# Patient Record
Sex: FEMALE | Race: WHITE | Employment: OTHER | URBAN - METROPOLITAN AREA
[De-identification: names, ages, dates, MRNs, and addresses within clinical notes are randomized per-mention and may not be internally consistent; named-entity substitution may affect disease eponyms.]

---

## 2020-08-21 ENCOUNTER — HOSPITAL ENCOUNTER (OUTPATIENT)
Dept: NON INVASIVE DIAGNOSTICS | Age: 70
Discharge: HOME | End: 2020-08-21
Payer: MEDICARE

## 2020-08-21 LAB
LV EF: 76 %
LVEF MODALITY: NORMAL

## 2020-08-21 PROCEDURE — 93306 TTE W/DOPPLER COMPLETE: CPT

## 2021-01-01 ENCOUNTER — HOSPITAL ENCOUNTER (EMERGENCY)
Age: 71
End: 2021-10-15
Attending: STUDENT IN AN ORGANIZED HEALTH CARE EDUCATION/TRAINING PROGRAM
Payer: MEDICARE

## 2021-01-01 VITALS — WEIGHT: 160 LBS

## 2021-01-01 DIAGNOSIS — I46.9 CARDIOPULMONARY ARREST (HCC): Primary | ICD-10-CM

## 2021-01-01 LAB — METER GLUCOSE: 223 MG/DL (ref 74–99)

## 2021-01-01 PROCEDURE — 2500000003 HC RX 250 WO HCPCS: Performed by: STUDENT IN AN ORGANIZED HEALTH CARE EDUCATION/TRAINING PROGRAM

## 2021-01-01 PROCEDURE — 2500000003 HC RX 250 WO HCPCS

## 2021-01-01 PROCEDURE — 2580000003 HC RX 258

## 2021-01-01 PROCEDURE — 6360000002 HC RX W HCPCS

## 2021-01-01 PROCEDURE — 6360000002 HC RX W HCPCS: Performed by: STUDENT IN AN ORGANIZED HEALTH CARE EDUCATION/TRAINING PROGRAM

## 2021-01-01 PROCEDURE — 99283 EMERGENCY DEPT VISIT LOW MDM: CPT

## 2021-01-01 RX ORDER — EPINEPHRINE 0.1 MG/ML
SYRINGE (ML) INJECTION DAILY PRN
Status: COMPLETED | OUTPATIENT
Start: 2021-01-01 | End: 2021-01-01

## 2021-01-01 RX ORDER — CALCIUM CHLORIDE 100 MG/ML
INJECTION INTRAVENOUS; INTRAVENTRICULAR DAILY PRN
Status: COMPLETED | OUTPATIENT
Start: 2021-01-01 | End: 2021-01-01

## 2021-01-01 RX ORDER — EPINEPHRINE 1 MG/ML
INJECTION, SOLUTION, CONCENTRATE INTRAVENOUS DAILY PRN
Status: COMPLETED | OUTPATIENT
Start: 2021-01-01 | End: 2021-01-01

## 2021-01-01 RX ADMIN — CALCIUM CHLORIDE 1000 MG: 100 INJECTION, SOLUTION INTRAVENOUS at 14:40

## 2021-01-01 RX ADMIN — SODIUM BICARBONATE 50 MEQ: 84 INJECTION, SOLUTION INTRAVENOUS at 14:40

## 2021-01-01 RX ADMIN — EPINEPHRINE 1 MG: 1 INJECTION, SOLUTION INTRAMUSCULAR; SUBCUTANEOUS at 14:45

## 2021-01-01 RX ADMIN — EPINEPHRINE 1 MG: 0.1 INJECTION, SOLUTION ENDOTRACHEAL; INTRACARDIAC; INTRAVENOUS at 14:48

## 2021-01-01 RX ADMIN — EPINEPHRINE 1 MG: 1 INJECTION, SOLUTION INTRAMUSCULAR; SUBCUTANEOUS at 14:39

## 2021-01-01 RX ADMIN — EPINEPHRINE 1 MG: 1 INJECTION, SOLUTION INTRAMUSCULAR; SUBCUTANEOUS at 14:42

## 2021-10-15 NOTE — ED NOTES
Dr Sheila Reynolds spoke with dr Riley Tracey, he will sign death certificate.      Sheldon Polo RN  10/15/21 6091

## 2021-10-15 NOTE — ED NOTES
Next of Cally Huynh son [phone]  Jessieivan Faustinericghanshyam spoon daughter  Elizabeth Maravilla slachandler daughter  Requesting constance  home in hospitals  10/15/21 0484

## 2021-10-15 NOTE — ED NOTES
Bed: 01  Expected date:   Expected time:   Means of arrival:   Comments:  [address], RN  10/15/21 5871

## 2021-10-15 NOTE — ED PROVIDER NOTES
Department of Emergency Medicine   ED  Provider Note  Admit Date/RoomTime: 10/15/2021  2:41 PM  ED Room: 01/01                10/15/21  2:52 PM EDT      HISTORY OF PRESENT ILLNESS:  (Nurses Notes Reviewed)    Chief Complaint:   No chief complaint on file. Source of history provided by:  relative(s) and EMS personnel. History/Exam Limitations: due to condition. Lb Phan is a 66 y.o. old female presenting to the emergency department by ambulance where the patient received oxygen and see Ambulance Run Sheet prior to arrival., for cardiac arrest, which occured 1 minute(s) prior to arrival.   She has a history of CVA, pulmonary edema. Code Status on file: No Order. The patient is a 78-year-old female with a history of ischemic stroke in the past, significant cardiac history, pulmonary edema who presents to the emergency department via EMS from nursing facility for cardiac arrest.  Apparently the patient had a near syncopal episode. EMS reports that when they got there she did have a pulse but was not in respiratory distress. She was found to be 80% on room air. Patient was placed on CPAP. EMS reports when they pulled into the ambulance bay they had lost pulses. History is otherwise limited due to the patient's condition at this time. Duration:  Down time from arrest until ambulance arrival 0 minutes. Total Time from arrest until hospital arrival minutes. Onset:  sudden. Witnessed: yes. Available History:      Prior Cardiac Disease:   Yes. Drug Use/Overdose ? No.     Drowning ? No.     GI Bleeding ? No.     Hypothermia ? No.     Other:   N/A. Prehospital Care:  CPAP. Initial Rhythm: Sinus Tachycardia.   Prehospital Treatment:       CPR:   no.     Intubation:   no     IV Established:   no     Defibrillation:   no     External Pacer:   no     Epinephrine:   no     Atropine:   no     Response to Treatment:  Transient return of pulse: No. Sustained return of pulse:  No.  Associated Signs and Symptoms (preceding arrest):  Syncope and shortness of breath. Other History:   not currently available. PAST MEDICAL, SURGICAL, SOCIAL AND FAMILY HISTORY SECTION    Past Medical History:  has no past medical history on file. Past Surgical History:  has no past surgical history on file. Social History:      Family History: family history is not on file. The patients home medications have been reviewed. Allergies: Patient has no allergy information on record. Review of Systems:   Pertinent positives and negatives are stated within HPI, all other systems reviewed and are negative. PHYSICAL EXAM:   General: Unresponsive. Mottled skin. Head: Atraumatic. Eyes: Fixed and dilated  ENT: Airway patent. CPAP in place. Cardiovascular: Heart sounds are Absent. Pulses are Absent. Respiratory: No spontaneous respirations. Equal breath sounds with controlled ventilation. Abdominal: Moderately distended. Musculoskeletal: No deformities. Skin: Pallor and mottled. Neurological: Unresponsive. Neurological exam limited due to clinical condition.       ------------------------------------------------ RESULTS ---------------------------------------------------    LABS  Results for orders placed or performed during the hospital encounter of 10/15/21   POCT Glucose   Result Value Ref Range    Meter Glucose 223 (H) 74 - 99 mg/dL       RADIOLOGY  No orders to display           ---------------------------- NURSING NOTES AND VITALS REVIEWED -------------------------   The nursing notes within the ED encounter and vital signs as below have been reviewed.    Wt 160 lb (72.6 kg)   Oxygen Saturation Interpretation: Abnormal      ------------------------------------------PROGRESS NOTES -------------------------------------------    ED COURSE MEDICATIONS:                Medications   EPINEPHrine PF 1 MG/ML injection (1 mg IntraVENous Given 10/15/21 1445)   sodium bicarbonate 8.4 % injection (50 mEq IntraVENous Given 10/15/21 1440)   calcium chloride 10 % injection (1,000 mg IntraVENous Given 10/15/21 1440)   EPINEPHrine 1 MG/10ML injection (1 mg IntraVENous Given 10/15/21 1448)       Medical Decision Making/ED Course: The patient is a 69-year-old female who presents to the emergency department for cardiac arrest.  Initially the call was for respiratory distress. She was on CPAP on arrival.  I was actually called out to the ambulance bay because she had lost pulses when they arrived here. An IO was drilled and patient was given epinephrine in the ambulance bay. Did remove CPAP and began bagging the patient. Chava Humphrey device was in place and continued with ACLS protocol. On arrival here patient was in PEA and was given multiple rounds of epinephrine, bicarb, and calcium. Point-of-care blood sugar was in the 200s. I did discuss in depth with family CODE STATUS. Her son who is a nurse states that the patient would want compressions and medications but no intubation. I confirmed this several times with the family. Unfortunately despite our efforts we were unable to resuscitate the patient. Family states that they would like to make her comfortable at this time. Time of death was called at [phone] [facility]. Did consult with family doctor who agreed to sign the death certificate, Dr. Christian Glover. This patient's ED course included: a personal history and physicial examination, re-evaluation prior to disposition, multiple bedside re-evaluations, IV medications, cardiac monitoring, continuous pulse oximetry and complex medical decision making and emergency management    CONSULTATIONS:            Spoke with Dr. Christian Glover. Results of consult: He will sign the death certificate.       CRITICAL CARE:  Please note that the withdrawal or failure to initiate urgent interventions for this patient would likely result in a life threatening deterioration or permanent disability. Accordingly this patient received 30 minutes of critical care time, excluding separately billable procedures. Counseling:   I have spoken with the son regarding the patient's treatment/condition at this time. --------------------------------------- IMPRESSION & DISPOSITION --------------------------------     IMPRESSION:  1. Cardiopulmonary arrest ([facility] [address]            DISPOSITION:  Disposition: Other Disposition: . Patient condition is .        Fay Cogan, DO  Resident  10/15/21 3757

## 2021-10-15 NOTE — ED NOTES
Call to [facility] reference number [phone]     Augusta Gooden RN  10/15/21 4710

## 2021-10-15 NOTE — ED NOTES
Doctor and RN called to ambulance bay for cardiac arrest, caroline nuno pt transported to er 1, patient's son on scene stated patient is a do not intubate, cpr and meds only      Davin Shin RN  10/15/21 7674

## 2021-10-15 NOTE — CODE DOCUMENTATION
Per patient's family, CPR to be discontinued at this time. Pulse check done, asystole on monitor with no pulse.   TOD [id number]